# Patient Record
Sex: MALE | Race: WHITE | NOT HISPANIC OR LATINO | Employment: FULL TIME | URBAN - METROPOLITAN AREA
[De-identification: names, ages, dates, MRNs, and addresses within clinical notes are randomized per-mention and may not be internally consistent; named-entity substitution may affect disease eponyms.]

---

## 2024-11-29 ENCOUNTER — APPOINTMENT (OUTPATIENT)
Dept: RADIOLOGY | Facility: MEDICAL CENTER | Age: 29
End: 2024-11-29
Attending: EMERGENCY MEDICINE
Payer: COMMERCIAL

## 2024-11-29 ENCOUNTER — HOSPITAL ENCOUNTER (EMERGENCY)
Facility: MEDICAL CENTER | Age: 29
End: 2024-11-29
Attending: EMERGENCY MEDICINE
Payer: COMMERCIAL

## 2024-11-29 ENCOUNTER — OFFICE VISIT (OUTPATIENT)
Dept: URGENT CARE | Facility: PHYSICIAN GROUP | Age: 29
End: 2024-11-29
Payer: COMMERCIAL

## 2024-11-29 VITALS
DIASTOLIC BLOOD PRESSURE: 58 MMHG | OXYGEN SATURATION: 98 % | TEMPERATURE: 97.5 F | WEIGHT: 153.77 LBS | HEART RATE: 83 BPM | HEIGHT: 70 IN | SYSTOLIC BLOOD PRESSURE: 110 MMHG | BODY MASS INDEX: 22.01 KG/M2 | RESPIRATION RATE: 14 BRPM

## 2024-11-29 VITALS
HEART RATE: 86 BPM | OXYGEN SATURATION: 97 % | RESPIRATION RATE: 24 BRPM | TEMPERATURE: 98.1 F | HEIGHT: 70 IN | SYSTOLIC BLOOD PRESSURE: 118 MMHG | WEIGHT: 154.32 LBS | DIASTOLIC BLOOD PRESSURE: 67 MMHG | BODY MASS INDEX: 22.09 KG/M2

## 2024-11-29 DIAGNOSIS — R29.898 DECREASED GRIP STRENGTH OF LEFT HAND: ICD-10-CM

## 2024-11-29 DIAGNOSIS — R51.9 ACUTE NONINTRACTABLE HEADACHE, UNSPECIFIED HEADACHE TYPE: ICD-10-CM

## 2024-11-29 DIAGNOSIS — R20.0 NUMBNESS AND TINGLING OF BOTH UPPER EXTREMITIES: ICD-10-CM

## 2024-11-29 DIAGNOSIS — R20.2 PARESTHESIA: ICD-10-CM

## 2024-11-29 DIAGNOSIS — E34.8 PINEAL GLAND CYST: ICD-10-CM

## 2024-11-29 DIAGNOSIS — R20.2 NUMBNESS AND TINGLING OF BOTH UPPER EXTREMITIES: ICD-10-CM

## 2024-11-29 LAB
ALBUMIN SERPL BCP-MCNC: 4.9 G/DL (ref 3.2–4.9)
ALBUMIN/GLOB SERPL: 1.8 G/DL
ALP SERPL-CCNC: 63 U/L (ref 30–99)
ALT SERPL-CCNC: 14 U/L (ref 2–50)
ANION GAP SERPL CALC-SCNC: 13 MMOL/L (ref 7–16)
AST SERPL-CCNC: 15 U/L (ref 12–45)
BASOPHILS # BLD AUTO: 0.2 % (ref 0–1.8)
BASOPHILS # BLD: 0.02 K/UL (ref 0–0.12)
BILIRUB SERPL-MCNC: 0.6 MG/DL (ref 0.1–1.5)
BUN SERPL-MCNC: 14 MG/DL (ref 8–22)
CALCIUM ALBUM COR SERPL-MCNC: 8.5 MG/DL (ref 8.5–10.5)
CALCIUM SERPL-MCNC: 9.2 MG/DL (ref 8.5–10.5)
CHLORIDE SERPL-SCNC: 103 MMOL/L (ref 96–112)
CO2 SERPL-SCNC: 23 MMOL/L (ref 20–33)
CREAT SERPL-MCNC: 0.74 MG/DL (ref 0.5–1.4)
EOSINOPHIL # BLD AUTO: 0.17 K/UL (ref 0–0.51)
EOSINOPHIL NFR BLD: 1.9 % (ref 0–6.9)
ERYTHROCYTE [DISTWIDTH] IN BLOOD BY AUTOMATED COUNT: 41.4 FL (ref 35.9–50)
GFR SERPLBLD CREATININE-BSD FMLA CKD-EPI: 125 ML/MIN/1.73 M 2
GLOBULIN SER CALC-MCNC: 2.8 G/DL (ref 1.9–3.5)
GLUCOSE SERPL-MCNC: 93 MG/DL (ref 65–99)
HCT VFR BLD AUTO: 45.3 % (ref 42–52)
HGB BLD-MCNC: 16.2 G/DL (ref 14–18)
IMM GRANULOCYTES # BLD AUTO: 0.02 K/UL (ref 0–0.11)
IMM GRANULOCYTES NFR BLD AUTO: 0.2 % (ref 0–0.9)
LYMPHOCYTES # BLD AUTO: 2.13 K/UL (ref 1–4.8)
LYMPHOCYTES NFR BLD: 24.3 % (ref 22–41)
MAGNESIUM SERPL-MCNC: 2.1 MG/DL (ref 1.5–2.5)
MCH RBC QN AUTO: 32 PG (ref 27–33)
MCHC RBC AUTO-ENTMCNC: 35.8 G/DL (ref 32.3–36.5)
MCV RBC AUTO: 89.5 FL (ref 81.4–97.8)
MONOCYTES # BLD AUTO: 0.8 K/UL (ref 0–0.85)
MONOCYTES NFR BLD AUTO: 9.1 % (ref 0–13.4)
NEUTROPHILS # BLD AUTO: 5.63 K/UL (ref 1.82–7.42)
NEUTROPHILS NFR BLD: 64.3 % (ref 44–72)
NRBC # BLD AUTO: 0 K/UL
NRBC BLD-RTO: 0 /100 WBC (ref 0–0.2)
PLATELET # BLD AUTO: 372 K/UL (ref 164–446)
PMV BLD AUTO: 8.8 FL (ref 9–12.9)
POTASSIUM SERPL-SCNC: 4 MMOL/L (ref 3.6–5.5)
PROT SERPL-MCNC: 7.7 G/DL (ref 6–8.2)
RBC # BLD AUTO: 5.06 M/UL (ref 4.7–6.1)
SODIUM SERPL-SCNC: 139 MMOL/L (ref 135–145)
WBC # BLD AUTO: 8.8 K/UL (ref 4.8–10.8)

## 2024-11-29 PROCEDURE — 85025 COMPLETE CBC W/AUTO DIFF WBC: CPT

## 2024-11-29 PROCEDURE — 99285 EMERGENCY DEPT VISIT HI MDM: CPT

## 2024-11-29 PROCEDURE — 80053 COMPREHEN METABOLIC PANEL: CPT

## 2024-11-29 PROCEDURE — 70496 CT ANGIOGRAPHY HEAD: CPT

## 2024-11-29 PROCEDURE — 36415 COLL VENOUS BLD VENIPUNCTURE: CPT

## 2024-11-29 PROCEDURE — 83735 ASSAY OF MAGNESIUM: CPT

## 2024-11-29 PROCEDURE — 70498 CT ANGIOGRAPHY NECK: CPT

## 2024-11-29 PROCEDURE — 700117 HCHG RX CONTRAST REV CODE 255: Performed by: EMERGENCY MEDICINE

## 2024-11-29 PROCEDURE — 94760 N-INVAS EAR/PLS OXIMETRY 1: CPT

## 2024-11-29 RX ADMIN — IOHEXOL 80 ML: 350 INJECTION, SOLUTION INTRAVENOUS at 20:30

## 2024-11-29 ASSESSMENT — ENCOUNTER SYMPTOMS
TINGLING: 1
NUMBNESS: 1
SENSORY CHANGE: 1
FEVER: 0
FOCAL WEAKNESS: 0
VOMITING: 0
BACK PAIN: 0
VERTIGO: 0
NAUSEA: 1
ANOREXIA: 0
LOSS OF CONSCIOUSNESS: 0
SEIZURES: 0
NERVOUS/ANXIOUS: 0
NECK PAIN: 0
COUGH: 0
VISUAL CHANGE: 0
DIAPHORESIS: 0
JOINT SWELLING: 0
DIARRHEA: 0
CHILLS: 0
ABDOMINAL PAIN: 0
TINGLING: 1
MYALGIAS: 0
SWOLLEN GLANDS: 0
CHANGE IN BOWEL HABIT: 0
FATIGUE: 0
ARTHRALGIAS: 0
WEAKNESS: 1
HEADACHES: 1
TREMORS: 0
SORE THROAT: 0
DIZZINESS: 0
SPEECH CHANGE: 0

## 2024-11-29 ASSESSMENT — PAIN DESCRIPTION - PAIN TYPE
TYPE: ACUTE PAIN
TYPE: ACUTE PAIN

## 2024-11-29 NOTE — PROGRESS NOTES
Subjective:   Heidi Maxwell is a 29 y.o. male who presents for Tingling (Hand is tingling ,bilateral >was in [place] fishing,had stroke like symptoms ,Ct in [place] and was told he has cyst on Pinel gland / )      Patient presents with complaints of bilateral hand tingling, new onset frontal headache, and numbness in bilateral upper extremities.  Patient states symptoms started approximately 5 days ago when he was on a fishing trip in [place].  Patient states he started to have facial numbness tingling, eventually had numbness in both hands to the point where he was also having weakness.  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  Patient states that healthcare providers in [place] did want him to be admitted and watched him overnight though patient refused due to cost.  States that his symptoms remained until the last 24 hours where he started to have headache as well.  In clinic he is in no acute distress, he is A and O x 4, alert, and across the room survey shows no FAST symptoms or emergent red flag symptoms.  He states he has had photosensitivity, but no other visual changes, no facial droop, no numbness in his tongue or anywhere else in his body aside from his upper extremities, denies any back or neck pain.    Tingling  This is a new problem. The current episode started in the past 7 days. The problem occurs constantly. The problem has been gradually worsening. Associated symptoms include headaches, nausea, numbness and weakness. Pertinent negatives include no abdominal pain, anorexia, arthralgias, change in bowel habit, chest pain, chills, congestion, coughing, diaphoresis, fatigue, fever, joint swelling, myalgias, neck pain, rash, sore throat, swollen glands, urinary symptoms, vertigo, visual change or vomiting. Nothing aggravates the symptoms. He has tried nothing for the symptoms.       Review of Systems   Constitutional:  Negative for  "chills, diaphoresis, fatigue and fever.   HENT:  Negative for congestion and sore throat.    Respiratory:  Negative for cough.    Cardiovascular:  Negative for chest pain.   Gastrointestinal:  Positive for nausea. Negative for abdominal pain, anorexia, change in bowel habit, diarrhea and vomiting.   Musculoskeletal:  Negative for arthralgias, back pain, joint pain, joint swelling, myalgias and neck pain.   Skin:  Negative for rash.   Neurological:  Positive for tingling, sensory change, weakness, numbness and headaches. Negative for dizziness, vertigo, tremors, speech change, focal weakness, seizures and loss of consciousness.   Psychiatric/Behavioral:  The patient is not nervous/anxious.      Refer to HPI for additional details.    During this visit, appropriate PPE was worn, and hand hygiene was performed.    PMH:  has no past medical history on file.    MEDS: No current outpatient medications on file.    ALLERGIES:   Allergies   Allergen Reactions    Prochlorperazine Unspecified and Hives     Other reaction(s): Other (See Comments)   Anxiety reaction. No hives    agitation    agitation    Anxiety reaction. No hives     Anxiety reaction. No hives     SURGHX: History reviewed. No pertinent surgical history.  SOCHX:  reports that he has never smoked. His smokeless tobacco use includes chew. He reports current alcohol use. He reports current drug use. Drug: Marijuana.    FH: Per [facility] as applicable/pertinent.    Medications, Allergies, and current problem list reviewed today in Epic.     Objective:     /58   Pulse 83   Temp 36.4 °C (97.5 °F) (Temporal)   Resp 14   Ht 1.778 m (5' 10\")   Wt 69.7 kg (153 lb 12.3 oz)   SpO2 98%     Physical Exam  Vitals reviewed.   Constitutional:       General: He is not in acute distress.     Appearance: Normal appearance. He is not ill-appearing, toxic-appearing or diaphoretic.   HENT:      Head: Normocephalic and atraumatic.      Mouth/Throat:      Mouth: Mucous membranes " are moist.      Pharynx: Oropharynx is clear.   Eyes:      General: No visual field deficit or scleral icterus.     Conjunctiva/sclera: Conjunctivae normal.      Pupils: Pupils are equal, round, and reactive to light.   Cardiovascular:      Rate and Rhythm: Normal rate and regular rhythm.      Pulses: Normal pulses.      Heart sounds: Normal heart sounds.   Pulmonary:      Effort: Pulmonary effort is normal.      Breath sounds: Normal breath sounds.   Abdominal:      General: Abdomen is flat. Bowel sounds are normal. There is no distension.      Palpations: Abdomen is soft. There is no mass.      Tenderness: There is no abdominal tenderness. There is no right CVA tenderness, left CVA tenderness, guarding or rebound.      Hernia: No hernia is present.   Musculoskeletal:         General: Normal range of motion.      Right hand: Decreased sensation of the ulnar distribution.      Left hand: Decreased strength. Decreased sensation of the ulnar distribution.        Arms:       Comments: Numbness in the above marked areas, patient states worse along bilateral ulnar aspects into the pinky.  Left  strength decreased.  Full range of motion of the wrist   Skin:     General: Skin is warm and dry.      Capillary Refill: Capillary refill takes less than 2 seconds.      Coloration: Skin is not jaundiced or pale.   Neurological:      Mental Status: He is alert and oriented to person, place, and time.      Cranial Nerves: No cranial nerve deficit, dysarthria or facial asymmetry.      Sensory: Sensory deficit present.      Motor: Weakness and pronator drift present. No tremor, atrophy, abnormal muscle tone or seizure activity.      Coordination: Coordination is intact.      Gait: Gait is intact.      Comments: Minor protective sensation bilateral upper extremities around the elbows, left  strength slightly weaker than the right.  Slight right pronator drift noticed in the right upper extremity         Assessment/Plan:      Diagnosis and associated orders:     1. Numbness and tingling of both upper extremities    2. Decreased  strength of left hand    3. Pineal gland cyst     Comments/MDM:     Patient history and physical exam are consistent with acute bilateral upper extremity numbness tingling and weakness, with concomitant new onset headache.  Overall in clinic patient does not appear ill is good historian of his health, though he does bring in outside records from the [facility] that he was seen at which does mention a 15 mm pineal cyst found.  On neurological examination, patient did have noted nystagmus, decreased  strength, impaired sensation.    I had a long discussion with patient about findings on my physical exam and the need for prompt evaluation and likely advanced imaging and evaluation from neurology/neurosurgery.  For this reason I did refer patient to be seen at [facility].  Patient is agreeable with plan of care.  I did recommend that he do not drive and that he be driven there by his father, patient's father was called coming to  patient to drive him to ED  [facility] called, report given to Kay KEENE  Patient discharged from clinic understanding plan of care under his own accord, and understands follow-up needed         Differential diagnosis, natural history, supportive care, and indications for immediate follow-up discussed.    Advised the patient to follow-up with the primary care physician for recheck, reevaluation, and consideration of further management.    Please note that this dictation was created using voice recognition software. I have made a reasonable attempt to correct obvious errors, but I expect that there are errors of grammar and possibly content that I did not discover before finalizing the note.    This note was electronically signed by RUDY New

## 2024-11-30 NOTE — ED TRIAGE NOTES
Chief Complaint   Patient presents with    Headache    Numbness     Pt complaining of numbness to his upper extremities and a headache. He states that he was recently in [place] when his face became numb and he was unable to speak. He had a CT done there which revealed a cyst in his brain.      Vitals:    11/29/24 1709   BP: 122/89   Pulse: 88   Resp: 18   Temp: 36.7 °C (98.1 °F)   SpO2: 100%     '

## 2024-11-30 NOTE — ED PROVIDER NOTES
"ED Provider Note    CHIEF COMPLAINT  Chief Complaint   Patient presents with    Headache    Numbness     Pt complaining of numbness to his upper extremities and a headache. He states that he was recently in [place] when his face became numb and he was unable to speak. He had a CT done there which revealed a cyst in his brain.        EXTERNAL RECORDS REVIEWED  Outpatient Notes reviewed office visit progress note dated today by RADHA Marie.  Patient seen for tingling to both hands, history of strokelike symptoms 5 days ago while in [place].    Outpt imaging: Reviewed noncontrast CT of the brain report dated November 24, 2024 from outlying facility in [place].  Probable pineal cyst noted, 15 mm.  Reviewed EKG from the same date demonstrated normal sinus rhythm with normal axis and normal intervals.  No diagnostic ST or T wave changes.    HPI/ROS  LIMITATION TO HISTORY   Select: : None  OUTSIDE HISTORIAN(S):  Parent father at bedside notes no known family history of significant neurologic diseases    Heidi Maxwell is a 29 y.o. male who presents for evaluation of a paresthesia to the fourth and fifth digits of both of his hands.  He notes symptom onset 5 days ago while he was on vacation in [place].  No head injury, he notes initially he felt headache, tingling to both hands, describes the bilateral carpal spasm, and tingling/numbness to both feet.  Relates he was seen at hospital and CT imaging was obtained demonstrating only a small cyst of the pineal gland.  He has had no recent previous neurologic imaging.  He notes persistent mild frontal headache that is somewhat worse with light, no phonophobia.  No nausea, no vomiting, no fever.  No neck pain or stiffness.  He relates the ultrasound station to legs has resolved and the carpal spasm is resolved, he notes a mild persistent headache that has not required any analgesia at home and does note occasionally on and off \"waves\" of tingling sensation still to digits 4 " "and 5 of both hands.  He has not had any symptoms like this before.  Patient seen urgent care and given his symptoms sent to the emergency department to be assessed further.    PAST MEDICAL HISTORY   Otherwise healthy    SURGICAL HISTORY  patient denies any surgical history    FAMILY HISTORY  No history of neurologic disease in the immediate family    SOCIAL HISTORY  Social History     Tobacco Use    Smoking status: Never    Smokeless tobacco: Current     Types: Chew   Vaping Use    Vaping status: Never Used   Substance and Sexual Activity    Alcohol use: Yes    Drug use: Yes     Types: Marijuana    Sexual activity: Not on file       CURRENT MEDICATIONS  Home Medications       Reviewed by Madi Mazariegos R.N. (Registered Nurse) on 11/29/24 at 1727  Med List Status: Not Addressed     Medication Last Dose Status        Patient Rom Taking any Medications                           ALLERGIES  Allergies   Allergen Reactions    Prochlorperazine Unspecified and Hives     Other reaction(s): Other (See Comments)   Anxiety reaction. No hives    agitation    agitation    Anxiety reaction. No hives     Anxiety reaction. No hives       PHYSICAL EXAM  VITAL SIGNS: /67   Pulse 86   Temp 36.7 °C (98.1 °F) (Temporal)   Resp (!) 24   Ht 1.778 m (5' 10\")   Wt 70 kg (154 lb 5.2 oz)   SpO2 97%   BMI 22.14 kg/m²    General: Alert, no acute distress  Skin: Warm, dry, normal for ethnicity  Head: Normocephalic, atraumatic  Neck: Trachea midline, no tenderness  Eye: PERRL, normal conjunctiva, extraocular movements are intact, fatigable nystagmus noted to the left  ENMT: Oral mucosa moist  Cardiovascular: Regular rate and rhythm, No murmur, Normal peripheral perfusion  Respiratory: Lungs CTA, respirations are non-labored, breath sounds are equal  Musculoskeletal: No swelling, no deformity  Neurological: Alert and oriented to person, place, time, and situation.  No upper nor lower extremity pronator drift, normal " finger-to-nose and normal heel-to-shin without past-pointing or ataxia.  Upper and lower extremity strength is 5 x 5 and symmetrical bilaterally.  Sensation is 5 x 5 and symmetrical bilaterally except for the digits 4 and 5 bilateral hands, sensation is intact with patient describes paresthesia with regard to light touch and pain.  Lymphatics: No lymphadenopathy  Psychiatric: Cooperative, appropriate mood & affect     EKG/LABS  Results for orders placed or performed during the hospital encounter of 11/29/24   CBC WITH DIFFERENTIAL    Collection Time: 11/29/24  6:38 PM   Result Value Ref Range    WBC 8.8 4.8 - 10.8 K/uL    RBC 5.06 4.70 - 6.10 M/uL    Hemoglobin 16.2 14.0 - 18.0 g/dL    Hematocrit 45.3 42.0 - 52.0 %    MCV 89.5 81.4 - 97.8 fL    MCH 32.0 27.0 - 33.0 pg    MCHC 35.8 32.3 - 36.5 g/dL    RDW 41.4 35.9 - 50.0 fL    Platelet Count 372 164 - 446 K/uL    MPV 8.8 (L) 9.0 - 12.9 fL    Neutrophils-Polys 64.30 44.00 - 72.00 %    Lymphocytes 24.30 22.00 - 41.00 %    Monocytes 9.10 0.00 - 13.40 %    Eosinophils 1.90 0.00 - 6.90 %    Basophils 0.20 0.00 - 1.80 %    Immature Granulocytes 0.20 0.00 - 0.90 %    Nucleated RBC 0.00 0.00 - 0.20 /100 WBC    Neutrophils (Absolute) 5.63 1.82 - 7.42 K/uL    Lymphs (Absolute) 2.13 1.00 - 4.80 K/uL    Monos (Absolute) 0.80 0.00 - 0.85 K/uL    Eos (Absolute) 0.17 0.00 - 0.51 K/uL    Baso (Absolute) 0.02 0.00 - 0.12 K/uL    Immature Granulocytes (abs) 0.02 0.00 - 0.11 K/uL    NRBC (Absolute) 0.00 K/uL   COMP METABOLIC PANEL    Collection Time: 11/29/24  6:38 PM   Result Value Ref Range    Sodium 139 135 - 145 mmol/L    Potassium 4.0 3.6 - 5.5 mmol/L    Chloride 103 96 - 112 mmol/L    Co2 23 20 - 33 mmol/L    Anion Gap 13.0 7.0 - 16.0    Glucose 93 65 - 99 mg/dL    Bun 14 8 - 22 mg/dL    Creatinine 0.74 0.50 - 1.40 mg/dL    Calcium 9.2 8.5 - 10.5 mg/dL    Correct Calcium 8.5 8.5 - 10.5 mg/dL    AST(SGOT) 15 12 - 45 U/L    ALT(SGPT) 14 2 - 50 U/L    Alkaline Phosphatase 63 30 -  99 U/L    Total Bilirubin 0.6 0.1 - 1.5 mg/dL    Albumin 4.9 3.2 - 4.9 g/dL    Total Protein 7.7 6.0 - 8.2 g/dL    Globulin 2.8 1.9 - 3.5 g/dL    A-G Ratio 1.8 g/dL   MAGNESIUM    Collection Time: 11/29/24  6:38 PM   Result Value Ref Range    Magnesium 2.1 1.5 - 2.5 mg/dL   ESTIMATED GFR    Collection Time: 11/29/24  6:38 PM   Result Value Ref Range    GFR (CKD-EPI) 125 >60 mL/min/1.73 m 2      I have independently interpreted these laboratory analysis    RADIOLOGY/PROCEDURES   I have independently interpreted the diagnostic imaging associated with this visit and am waiting the final reading from the radiologist.   My preliminary interpretation is as follows: No midline shift, no intracranial hemorrhage.    Radiologist interpretation:  CT-CTA NECK WITH & W/O-POST PROCESSING   Final Result      1.  7 mm RIGHT upper lobe pulmonary nodule   2.  CT angiogram of the neck within normal limits.      RECOMMENDATION FOR PULMONARY NODULE EVALUATION:   Low Risk: CT at 6-12 months, then consider CT at 18-24 months      High Risk: CT at 6-12 months, then CT at 18-24 months      Low Risk - Minimal or absent history of smoking and of other known risk factors.      High Risk - History of smoking or of other known risk factors.      Note: These recommendations do not apply to lung cancer screening, patients with immunosuppression, or patients with known primary cancer.      Fleischner Society 2017 Guidelines for Management of Incidentally Detected Pulmonary Nodules in Adults         CT-CTA HEAD WITH & W/O-POST PROCESS   Final Result      CT angiogram of the [place] within normal limits.             COURSE & MEDICAL DECISION MAKING    ASSESSMENT, COURSE AND PLAN  Care Narrative: Very pleasant 29-year-old presents for evaluation of waxing and waning sensation of paresthesia to the upper extremities and headache.  Symptoms improving but still present from initial presentation that at that time involve the lower extremities as  well.  Patient arrives with report of CT [place] potential small cyst in the pineal gland.  This is not redemonstrated on imaging today.  CTA demonstrates no evidence of aneurysm or hemorrhage, reassuring CTA of the neck as well.  Incidental 7 mm right upper lobe nodule noted which is low risk given no history of smoking or other risk factors.  This is incidental and not a factor with regard to the acute presentation.  Patient amenable to follow-up with primary care.      With regard to the paresthesia there is no evidence suggestive of significant neurologic deficit, no evidence of CVA or intracranial hemorrhage.  No evidence of  hypertensive emergency.  No indication of such for inpatient management, patient is amenable to follow-up with both primary care and given the persistence of the paresthesia neurology as well, appropriate referrals are ordered from the ED.    ED OBS: Yes; I am placing the patient in to an observation status due to a diagnostic uncertainty as well as therapeutic intensity. Patient placed in observation status at 6:11 PM, 11/29/2024.     Observation plan is as follows: CTA of head and neck will be obtained as well as metabolic workup.  Differential diagnosis at this point includes was not restricted to paresthesia, electrolyte derangement, intracranial hemorrhage, mass effect, demyelinating condition, neurodegenerative disorder    2043: Patient reassessed, in no nohemy distress.  Updated with reassuring workup results.    Upon Reevaluation, the patient's condition has: Improved; and will be discharged.    Patient discharged from ED Observation status at 2000 (Time) 11/29/24 (Date).       Patient Vitals for the past 24 hrs:   BP Temp Temp src Pulse Resp SpO2 Height Weight   11/29/24 2000 118/67 -- -- 86 (!) 24 97 % -- --   11/29/24 1900 129/71 -- -- 75 (!) 25 97 % -- --   11/29/24 1830 124/77 -- -- 74 20 96 % -- --   11/29/24 1800 115/69 -- -- 75 15 97 %  "-- --   11/29/24 1723 -- -- -- -- -- -- 1.778 m (5' 10\") 70 kg (154 lb 5.2 oz)   11/29/24 1709 122/89 36.7 °C (98.1 °F) Temporal 88 18 100 % -- --        ADDITIONAL PROBLEMS MANAGED  Headache, paresthesia    DISPOSITION AND DISCUSSIONS  I have discussed management of the patient with the following physicians and EVONNE's:  NA    Discussion of management with other [facility] or appropriate source(s): None     Escalation of care considered, and ultimately not performed:NA    Barriers to care at this time, including but not limited to:  NA .     Decision tools and prescription drugs considered including, but not limited to:  NIH stroke scale is 1 .    The patient will return for new or worsening symptoms and is stable at the time of discharge.    Patient has had high blood pressure while in the emergency department, felt likely secondary to medical condition. Counseled patient to monitor blood pressure at home and follow up with primary care physician.      DISPOSITION:  Patient will be discharged home in stable condition.    FOLLOW UP:  Sukh Castanon M.D.  [address] [place] [address] [id number]  [facility] [id number] [phone]    Schedule an appointment as soon as possible for a visit       [address]  [facility] [address]  [phone]          OUTPATIENT MEDICATIONS:  There are no discharge medications for this patient.         FINAL DIAGNOSIS  1. Acute nonintractable headache, unspecified headache type    2. Paresthesia         Electronically signed by: Sis Meza M.D., 11/29/2024 6:09 PM      "

## 2024-11-30 NOTE — ED NOTES
Pt ambulatory to [facility] 57 with steady gait. Triage note reviewed and agreed with.  Dressed down to gown, placed on continuous monitoring, call light within reach.

## 2024-11-30 NOTE — ED NOTES
"Heidi Maxwell has been discharged from the Emergency Room.    IV discontinued and gauze bandage placed, pt in possession of belongings.    Discharge instructions, which include signs and symptoms to monitor patient for, as well as detailed information regarding Paresthesia provided.  Patient verbalizes understanding of follow up care and medication management. All questions and concerns addressed at this time.     Patient provided with education on when to return to the ER and verbally understands with no concerns. Patient advised on setting up MyChart and information provided about patient survey.  Patient leaves ER in no apparent distress. This RN provided education regarding returning to the ER for any new concerns or changes in patient's condition.      /67   Pulse 86   Temp 36.7 °C (98.1 °F) (Temporal)   Resp (!) 24   Ht 1.778 m (5' 10\")   Wt 70 kg (154 lb 5.2 oz)   SpO2 97%   BMI 22.14 kg/m²   "

## 2024-12-12 ENCOUNTER — TELEPHONE (OUTPATIENT)
Dept: HEALTH INFORMATION MANAGEMENT | Facility: OTHER | Age: 29
End: 2024-12-12
Payer: COMMERCIAL

## 2025-01-21 ENCOUNTER — APPOINTMENT (OUTPATIENT)
Dept: NEUROLOGY | Facility: MEDICAL CENTER | Age: 30
End: 2025-01-21
Attending: PSYCHIATRY & NEUROLOGY
Payer: COMMERCIAL